# Patient Record
Sex: MALE | Race: WHITE | Employment: OTHER | URBAN - NONMETROPOLITAN AREA
[De-identification: names, ages, dates, MRNs, and addresses within clinical notes are randomized per-mention and may not be internally consistent; named-entity substitution may affect disease eponyms.]

---

## 2017-09-28 ENCOUNTER — HOSPITAL ENCOUNTER (EMERGENCY)
Age: 32
Discharge: HOME | End: 2017-09-28
Payer: COMMERCIAL

## 2017-09-28 VITALS
OXYGEN SATURATION: 99 % | WEIGHT: 133.2 LBS | RESPIRATION RATE: 16 BRPM | SYSTOLIC BLOOD PRESSURE: 128 MMHG | DIASTOLIC BLOOD PRESSURE: 84 MMHG | BODY MASS INDEX: 19.07 KG/M2 | HEART RATE: 87 BPM | HEIGHT: 70 IN | TEMPERATURE: 98.6 F

## 2017-09-28 DIAGNOSIS — J06.9 VIRAL URI WITH COUGH: Primary | ICD-10-CM

## 2017-09-28 PROCEDURE — 99213 OFFICE O/P EST LOW 20 MIN: CPT

## 2017-09-28 PROCEDURE — 99214 OFFICE O/P EST MOD 30 MIN: CPT | Performed by: NURSE PRACTITIONER

## 2017-10-01 ASSESSMENT — ENCOUNTER SYMPTOMS
COUGH: 1
RHINORRHEA: 0
SINUS CONGESTION: 1
SORE THROAT: 0
WHEEZING: 0
TROUBLE SWALLOWING: 0
SHORTNESS OF BREATH: 0
STRIDOR: 0
VOICE CHANGE: 0
SINUS PRESSURE: 0

## 2019-10-06 ENCOUNTER — HOSPITAL ENCOUNTER (EMERGENCY)
Age: 34
Discharge: HOME | End: 2019-10-06
Payer: COMMERCIAL

## 2019-10-06 VITALS
HEART RATE: 79 BPM | SYSTOLIC BLOOD PRESSURE: 108 MMHG | TEMPERATURE: 97.8 F | BODY MASS INDEX: 19.37 KG/M2 | DIASTOLIC BLOOD PRESSURE: 68 MMHG | WEIGHT: 135 LBS | RESPIRATION RATE: 16 BRPM | OXYGEN SATURATION: 97 %

## 2019-10-06 DIAGNOSIS — H61.21 IMPACTED CERUMEN OF RIGHT EAR: Primary | ICD-10-CM

## 2019-10-06 PROCEDURE — 99213 OFFICE O/P EST LOW 20 MIN: CPT | Performed by: NURSE PRACTITIONER

## 2019-10-06 PROCEDURE — 69209 REMOVE IMPACTED EAR WAX UNI: CPT

## 2019-10-06 PROCEDURE — 99212 OFFICE O/P EST SF 10 MIN: CPT

## 2019-10-06 RX ORDER — ACETAMINOPHEN 500 MG
1000 TABLET ORAL EVERY 6 HOURS PRN
COMMUNITY

## 2019-10-06 ASSESSMENT — ENCOUNTER SYMPTOMS
EYE ITCHING: 0
DIARRHEA: 0
NAUSEA: 0
RHINORRHEA: 0
VOMITING: 0
TROUBLE SWALLOWING: 0
EYE DISCHARGE: 0
SORE THROAT: 0
COUGH: 0
EYE PAIN: 0
CONSTIPATION: 0
ABDOMINAL PAIN: 0
SHORTNESS OF BREATH: 0
WHEEZING: 0
SINUS PRESSURE: 0
BACK PAIN: 0
EYE REDNESS: 0
CHEST TIGHTNESS: 0

## 2025-02-13 ENCOUNTER — HOSPITAL ENCOUNTER (EMERGENCY)
Age: 40
Discharge: HOME | End: 2025-02-13
Payer: COMMERCIAL

## 2025-02-13 VITALS
RESPIRATION RATE: 16 BRPM | OXYGEN SATURATION: 100 % | SYSTOLIC BLOOD PRESSURE: 115 MMHG | DIASTOLIC BLOOD PRESSURE: 86 MMHG | HEART RATE: 78 BPM

## 2025-02-13 DIAGNOSIS — H61.21 IMPACTED CERUMEN OF RIGHT EAR: Primary | ICD-10-CM

## 2025-02-13 PROCEDURE — 99203 OFFICE O/P NEW LOW 30 MIN: CPT | Performed by: NURSE PRACTITIONER

## 2025-02-13 PROCEDURE — 99203 OFFICE O/P NEW LOW 30 MIN: CPT

## 2025-02-13 RX ORDER — APIXABAN 5 MG/1
5 TABLET, FILM COATED ORAL 2 TIMES DAILY
COMMUNITY
Start: 2025-01-15

## 2025-02-13 ASSESSMENT — ENCOUNTER SYMPTOMS
RHINORRHEA: 1
DIARRHEA: 0
ABDOMINAL PAIN: 0
CHEST TIGHTNESS: 0
COUGH: 1
WHEEZING: 0
CHOKING: 0
SHORTNESS OF BREATH: 0
APNEA: 0
VOMITING: 0
STRIDOR: 0
SORE THROAT: 0

## 2025-02-13 ASSESSMENT — PAIN - FUNCTIONAL ASSESSMENT: PAIN_FUNCTIONAL_ASSESSMENT: NONE - DENIES PAIN

## 2025-02-13 NOTE — ED PROVIDER NOTES
[facility] URGENT CARE  Urgent Care Encounter      CHIEF COMPLAINT       Chief Complaint   Patient presents with    INFLUENZA A    Ear Pain     RIGHT EAR PAIN AND CLOGGED        Nurses Notes reviewed and I agree except as noted in the HPI.  HISTORY OFPRESENT ILLNESS   Moi Velazqueziott is a 39 y.o.  The history is provided by the patient. No  was used.   Ear Problem  Location:  Right  Behind ear:  No abnormality  Quality:  Pressure  Severity:  Severe  Onset quality:  Gradual  Duration:  6 months  Timing:  Intermittent  Progression:  Waxing and waning  Chronicity:  Recurrent  Context: recent URI    Context: not direct blow, not elevation change, not foreign body in ear and not loud noise    Relieved by:  Nothing  Worsened by:  Nothing  Ineffective treatments:  None tried  Associated symptoms: congestion, cough, hearing loss and rhinorrhea    Associated symptoms: no abdominal pain, no diarrhea, no ear discharge, no fever, no headaches, no neck pain, no rash, no sore throat, no tinnitus and no vomiting    Risk factors: no recent travel, no chronic ear infection and no prior ear surgery        REVIEW OF SYSTEMS     Review of Systems   Constitutional:  Negative for activity change, appetite change, chills, diaphoresis, fatigue and fever.   HENT:  Positive for congestion, hearing loss and rhinorrhea. Negative for ear discharge, sore throat and tinnitus.    Respiratory:  Positive for cough. Negative for apnea, choking, chest tightness, shortness of breath, wheezing and stridor.    Cardiovascular:  Negative for chest pain, palpitations and leg swelling.   Gastrointestinal:  Negative for abdominal pain, diarrhea and vomiting.   Musculoskeletal:  Negative for neck pain.   Skin:  Negative for rash.   Neurological:  Negative for headaches.       PAST MEDICAL HISTORY         Diagnosis Date    Anxiety     Asthma exacerbation 12/02/2016    DVT (deep venous thrombosis) ([facility])     Pulmonary embolism ([facility]) 2019